# Patient Record
Sex: FEMALE | Race: WHITE | NOT HISPANIC OR LATINO
[De-identification: names, ages, dates, MRNs, and addresses within clinical notes are randomized per-mention and may not be internally consistent; named-entity substitution may affect disease eponyms.]

---

## 2017-02-08 ENCOUNTER — APPOINTMENT (OUTPATIENT)
Dept: SURGERY | Facility: CLINIC | Age: 62
End: 2017-02-08

## 2017-08-21 ENCOUNTER — APPOINTMENT (OUTPATIENT)
Dept: SURGERY | Facility: CLINIC | Age: 62
End: 2017-08-21
Payer: COMMERCIAL

## 2017-08-21 PROCEDURE — 99214 OFFICE O/P EST MOD 30 MIN: CPT

## 2018-08-20 ENCOUNTER — APPOINTMENT (OUTPATIENT)
Dept: SURGERY | Facility: CLINIC | Age: 63
End: 2018-08-20
Payer: COMMERCIAL

## 2018-08-20 PROCEDURE — 99214 OFFICE O/P EST MOD 30 MIN: CPT

## 2019-08-21 ENCOUNTER — APPOINTMENT (OUTPATIENT)
Dept: SURGERY | Facility: CLINIC | Age: 64
End: 2019-08-21
Payer: COMMERCIAL

## 2019-08-21 PROCEDURE — 99214 OFFICE O/P EST MOD 30 MIN: CPT

## 2019-08-21 NOTE — PHYSICAL EXAM
[de-identified] : No cervical or supraclavicular adenopathy, trachea midline, thyroid without enlargement or palpable nodules. [Normal] : orientation to person, place, and time: normal

## 2019-08-21 NOTE — HISTORY OF PRESENT ILLNESS
[de-identified] : Patient reports resolution of neck swelling and discomfort. Prior ultrasound January 14, 2016: Right local 0.4 x 1.8 x 1.5 CM with mid 1.6 x 1.2 x 1.3 CM nodule, 9 mm isthmus nodule unchanged. Left lobe 3.5 x 2 x 1.7 CM with ill defined 3 x 2 cm mass with increased vascularity.Biopsy was benign. Patient denies dysphagia, change in voice or recent illness.\par Followup ultrasound August 2016 with slight decrease in thyroid nodule size. Resolution of symptoms. Thyroid ultrasound August 2017, right nodule slightly decreased compared to prior study. Blood work in February normal. Patient with 3 1/2 year hx of thyorid nodules. prior biopsy of dominant right nodule 2016 benign.  Denies dysphagia, hoarseness palpitations or fatigue. now on [place] with dr de la cruz.  Denies recent illness. repeat US 8/2019 with slight increase in right nodule. still smaller than initial measurement 2016. \par

## 2020-02-26 ENCOUNTER — APPOINTMENT (OUTPATIENT)
Dept: SURGERY | Facility: CLINIC | Age: 65
End: 2020-02-26
Payer: COMMERCIAL

## 2020-02-26 PROCEDURE — 99213 OFFICE O/P EST LOW 20 MIN: CPT

## 2020-02-26 NOTE — HISTORY OF PRESENT ILLNESS
[de-identified] : Patient reports resolution of neck swelling and discomfort. Prior ultrasound January 14, 2016: Right local 0.4 x 1.8 x 1.5 CM with mid 1.6 x 1.2 x 1.3 CM nodule, 9 mm isthmus nodule unchanged. Left lobe 3.5 x 2 x 1.7 CM with ill defined 3 x 2 cm mass with increased vascularity.Biopsy was benign. Patient denies dysphagia, change in voice or recent illness.\par Followup ultrasound August 2016 with slight decrease in thyroid nodule size. Resolution of symptoms. Thyroid ultrasound August 2017, right nodule slightly decreased compared to prior study. Blood work in February normal. \par Patient with 4 year hx of thyorid nodules. prior biopsy of dominant right nodule 2016 benign.  Denies dysphagia, hoarseness palpitations or fatigue.  Thyroid US 2/2020 stable right nodule 1.2 cm. now on [place] with dr de la cruz.  Denies recent illness. \par

## 2020-02-26 NOTE — PHYSICAL EXAM
[de-identified] : No cervical or supraclavicular adenopathy, trachea midline, thyroid without enlargement or palpable nodules. [Normal] : no neck adenopathy [de-identified] : Skin:  normal appearance.  no rash, nodules, vesicles, or erythema,\par Musculoskeletal:  full range of motion and no deformities appreciated\par Neurological:  grossly intact\par Psychiatric:  oriented to person, place and time with appropriate affect

## 2020-02-26 NOTE — ASSESSMENT
[FreeTextEntry1] : no suspicious findings,stable nodule on US< will monitor, f/u US 8/2020 if stable RTo 1 year

## 2020-09-03 ENCOUNTER — APPOINTMENT (OUTPATIENT)
Dept: SURGERY | Facility: CLINIC | Age: 65
End: 2020-09-03
Payer: COMMERCIAL

## 2020-09-03 PROCEDURE — 99213 OFFICE O/P EST LOW 20 MIN: CPT

## 2020-09-03 NOTE — PHYSICAL EXAM
[de-identified] : No cervical or supraclavicular adenopathy, trachea midline, thyroid without enlargement or palpable nodules. [Normal] : no neck adenopathy [de-identified] : Skin:  normal appearance.  no rash, nodules, vesicles, or erythema,\par Musculoskeletal:  full range of motion and no deformities appreciated\par Neurological:  grossly intact\par Psychiatric:  oriented to person, place and time with appropriate affect

## 2020-09-03 NOTE — ASSESSMENT
[FreeTextEntry1] : no suspicious findings,stable nodule on US< will monitor, f/u US 3/2021   if stable RTo 1 year

## 2020-09-03 NOTE — HISTORY OF PRESENT ILLNESS
[de-identified] : Patient reports resolution of neck swelling and discomfort. Prior ultrasound January 14, 2016: Right local 0.4 x 1.8 x 1.5 CM with mid 1.6 x 1.2 x 1.3 CM nodule, 9 mm isthmus nodule unchanged. Left lobe 3.5 x 2 x 1.7 CM with ill defined 3 x 2 cm mass with increased vascularity.Biopsy was benign. Patient denies dysphagia, change in voice or recent illness.\par Followup ultrasound August 2016 with slight decrease in thyroid nodule size. Resolution of symptoms. Thyroid ultrasound August 2017, right nodule slightly decreased compared to prior study. Blood work in February normal. \par Patient with 4 year hx of thyroid nodules. prior biopsy of dominant right nodule 2016 benign.  Denies dysphagia, hoarseness palpitations or fatigue.  Thyroid US 8/2020 stable right nodule 1.2 cm. now on [place] with dr de la cruz.  Denies recent illness. \par

## 2021-03-09 ENCOUNTER — NON-APPOINTMENT (OUTPATIENT)
Age: 66
End: 2021-03-09

## 2021-04-15 ENCOUNTER — APPOINTMENT (OUTPATIENT)
Dept: SURGERY | Facility: CLINIC | Age: 66
End: 2021-04-15
Payer: COMMERCIAL

## 2021-04-15 ENCOUNTER — LABORATORY RESULT (OUTPATIENT)
Age: 66
End: 2021-04-15

## 2021-04-15 PROCEDURE — 99072 ADDL SUPL MATRL&STAF TM PHE: CPT

## 2021-04-15 PROCEDURE — 99213 OFFICE O/P EST LOW 20 MIN: CPT

## 2021-04-15 PROCEDURE — 10005 FNA BX W/US GDN 1ST LES: CPT

## 2021-04-15 NOTE — PHYSICAL EXAM
[de-identified] : No cervical or supraclavicular adenopathy, trachea midline, thyroid without enlargement or palpable nodules. [Normal] : no neck adenopathy [de-identified] : Skin:  normal appearance.  no rash, nodules, vesicles, or erythema,\par Musculoskeletal:  full range of motion and no deformities appreciated\par Neurological:  grossly intact\par Psychiatric:  oriented to person, place and time with appropriate affect

## 2021-04-15 NOTE — HISTORY OF PRESENT ILLNESS
[de-identified] : Patient reports resolution of neck swelling and discomfort. Prior ultrasound January 14, 2016: Right local 0.4 x 1.8 x 1.5 CM with mid 1.6 x 1.2 x 1.3 CM nodule, 9 mm isthmus nodule unchanged. Left lobe 3.5 x 2 x 1.7 CM with ill defined 3 x 2 cm mass with increased vascularity.Biopsy was benign. Patient denies dysphagia, change in voice or recent illness.\par Followup ultrasound August 2016 with slight decrease in thyroid nodule size. Resolution of symptoms. Thyroid ultrasound August 2017, right nodule slightly decreased compared to prior study. Blood work in February normal. \par Patient with 5 year hx of thyroid nodules. prior biopsy of dominant right nodule 2016 benign.  Denies dysphagia, hoarseness palpitations or fatigue.  Thyroid US 8/2020 stable right nodule 1.2 cm. f/u US 3/2021 with increase in right nodule now 2 cm.   now on [place] [address] with dr de la cruz had brianne 2 months ago in good range per patient, reports not available. Denies recent illness. I have reviewed all old and new data and available images.

## 2021-04-15 NOTE — ASSESSMENT
[FreeTextEntry1] : no suspicious findings,enlarging right nodule, US guided FNA [id number], if benign, f/u US 9/2021 , if stabl eRTO 1 year.

## 2021-04-15 NOTE — PROCEDURE
[FreeTextEntry1] : US  guided FNA [FreeTextEntry2] : enlarging right nodule now 2 cm [FreeTextEntry3] : Patient for thyroid biopsy. Risks benefits and alternatives discussed including bleeding, infection, swelling and need for repeat biopsy. Questions answered.\par Consent obtained, timeout taken.\par \par Patient supine.\par No anesthetic used. \par Nodule localized with US guidance\par Sterile technique with Betadine skin prep.\par 22-gauge needle under ultrasound guidance with a single pass.\par Slides prepared sent to histology.\par \par Post procedure:\par Hemostasis obtained, patient tolerated well, no complications.\par Post procedure instructions given.\par

## 2021-04-19 ENCOUNTER — NON-APPOINTMENT (OUTPATIENT)
Age: 66
End: 2021-04-19

## 2021-04-27 ENCOUNTER — APPOINTMENT (OUTPATIENT)
Dept: ULTRASOUND IMAGING | Facility: IMAGING CENTER | Age: 66
End: 2021-04-27
Payer: COMMERCIAL

## 2021-04-27 ENCOUNTER — RESULT REVIEW (OUTPATIENT)
Age: 66
End: 2021-04-27

## 2021-04-27 ENCOUNTER — OUTPATIENT (OUTPATIENT)
Dept: OUTPATIENT SERVICES | Facility: HOSPITAL | Age: 66
LOS: 1 days | End: 2021-04-27
Payer: COMMERCIAL

## 2021-04-27 DIAGNOSIS — E04.2 NONTOXIC MULTINODULAR GOITER: ICD-10-CM

## 2021-04-27 DIAGNOSIS — Z00.8 ENCOUNTER FOR OTHER GENERAL EXAMINATION: ICD-10-CM

## 2021-04-27 PROCEDURE — 10005 FNA BX W/US GDN 1ST LES: CPT

## 2021-04-27 PROCEDURE — 88173 CYTOPATH EVAL FNA REPORT: CPT | Mod: 26

## 2021-04-27 PROCEDURE — 88173 CYTOPATH EVAL FNA REPORT: CPT

## 2021-04-27 PROCEDURE — 88172 CYTP DX EVAL FNA 1ST EA SITE: CPT

## 2021-04-28 LAB — NON-GYNECOLOGICAL CYTOLOGY STUDY: SIGNIFICANT CHANGE UP

## 2021-04-30 ENCOUNTER — NON-APPOINTMENT (OUTPATIENT)
Age: 66
End: 2021-04-30

## 2021-05-10 ENCOUNTER — NON-APPOINTMENT (OUTPATIENT)
Age: 66
End: 2021-05-10

## 2021-10-09 ENCOUNTER — NON-APPOINTMENT (OUTPATIENT)
Age: 66
End: 2021-10-09

## 2022-04-28 ENCOUNTER — APPOINTMENT (OUTPATIENT)
Dept: SURGERY | Facility: CLINIC | Age: 67
End: 2022-04-28
Payer: COMMERCIAL

## 2022-04-28 PROCEDURE — 99213 OFFICE O/P EST LOW 20 MIN: CPT

## 2022-04-28 NOTE — HISTORY OF PRESENT ILLNESS
[de-identified] : Patient reports resolution of neck swelling and discomfort. Prior ultrasound January 14, 2016: Right local 0.4 x 1.8 x 1.5 CM with mid 1.6 x 1.2 x 1.3 CM nodule, 9 mm isthmus nodule unchanged. Left lobe 3.5 x 2 x 1.7 CM with ill defined 3 x 2 cm mass with increased vascularity.Biopsy was benign. Patient denies dysphagia, change in voice or recent illness.\par Followup ultrasound August 2016 with slight decrease in thyroid nodule size. Resolution of symptoms. Thyroid ultrasound August 2017, right nodule slightly decreased compared to prior study. Blood work in February normal. \par Patient with 6 year hx of thyroid nodules. prior biopsy of dominant right nodule 2016 benign.  Denies dysphagia, hoarseness palpitations or fatigue.  Thyroid US 8/2020 stable right nodule 1.2 cm. f/u US 3/2021 with increase in right nodule  2 cm. FNA 4/2021 benign.  now on Cyt[place] 125 with dr de la cruz monitoring brianne vikieling very well. thyroid uS 9/2021 with right nodule smaller now 1.5 cm. Denies recent illness. I have reviewed all old and new data and available images.

## 2022-04-28 NOTE — PHYSICAL EXAM
[de-identified] : No cervical or supraclavicular adenopathy, trachea midline, thyroid without enlargement or palpable nodules. [Normal] : no neck adenopathy [de-identified] : Skin:  normal appearance.  no rash, nodules, vesicles, or erythema,\par Musculoskeletal:  full range of motion and no deformities appreciated\par Neurological:  grossly intact\par Psychiatric:  oriented to person, place and time with appropriate affect

## 2022-04-28 NOTE — ASSESSMENT
[FreeTextEntry1] : no suspicious findings, stable right nodule, US guided FNA was benign, f/u US 9/2022 , if stable RTO 1 year.   I have answered their questions to the best of my ability.\par

## 2022-10-24 ENCOUNTER — NON-APPOINTMENT (OUTPATIENT)
Age: 67
End: 2022-10-24

## 2022-11-01 ENCOUNTER — APPOINTMENT (OUTPATIENT)
Dept: SURGERY | Facility: CLINIC | Age: 67
End: 2022-11-01

## 2022-11-01 PROCEDURE — 99213 OFFICE O/P EST LOW 20 MIN: CPT

## 2022-11-01 NOTE — PHYSICAL EXAM
[de-identified] : No cervical or supraclavicular adenopathy, trachea midline, thyroid without enlargement or palpable nodules. [Normal] : no neck adenopathy [de-identified] : Skin:  normal appearance.  no rash, nodules, vesicles, or erythema,\par Musculoskeletal:  full range of motion and no deformities appreciated\par Neurological:  grossly intact\par Psychiatric:  oriented to person, place and time with appropriate affect

## 2022-11-01 NOTE — HISTORY OF PRESENT ILLNESS
[de-identified] : Patient reports resolution of neck swelling and discomfort. Prior ultrasound January 14, 2016: Right local 0.4 x 1.8 x 1.5 CM with mid 1.6 x 1.2 x 1.3 CM nodule, 9 mm isthmus nodule unchanged. Left lobe 3.5 x 2 x 1.7 CM with ill defined 3 x 2 cm mass with increased vascularity.Biopsy was benign. Patient denies dysphagia, change in voice or recent illness.\par Followup ultrasound August 2016 with slight decrease in thyroid nodule size. Resolution of symptoms. Thyroid ultrasound August 2017, right nodule slightly decreased compared to prior study. Blood work in February normal. \par Patient with 7 year hx of thyroid nodules. prior biopsy of dominant right nodule 2016 benign.  Denies dysphagia, hoarseness palpitations or fatigue.  Thyroid US 8/2020 stable right nodule 1.2 cm. f/u US 3/2021 with increase in right nodule  2 cm. FNA 4/2021 benign.  now on Cytomel 125 with dr de la cruz monitoring brianne feeeling very well. thyroid uS 9/2021 with right nodule smaller now 1.5 cm.  Thyroid ultrasound September 2022 with new subcentimeter right upper nodule, continued decrease in previously noted right nodule..  Denies recent illness. I have reviewed all old and new data and available images.

## 2022-11-01 NOTE — ASSESSMENT
[FreeTextEntry1] : no suspicious findings, decreasing right nodule, prior  US guided FNA was benign, f/u US 9/2022 minimal change without suspicious characteristics.  Recommend repeat ultrasound September 2023, RTO 1 year.   I have answered their questions to the best of my ability.\par

## 2023-09-13 ENCOUNTER — NON-APPOINTMENT (OUTPATIENT)
Age: 68
End: 2023-09-13

## 2023-11-16 ENCOUNTER — APPOINTMENT (OUTPATIENT)
Dept: SURGERY | Facility: CLINIC | Age: 68
End: 2023-11-16
Payer: MEDICARE

## 2023-11-16 DIAGNOSIS — E04.2 NONTOXIC MULTINODULAR GOITER: ICD-10-CM

## 2023-11-16 PROCEDURE — 36415 COLL VENOUS BLD VENIPUNCTURE: CPT

## 2023-11-16 PROCEDURE — 99213 OFFICE O/P EST LOW 20 MIN: CPT

## 2023-11-21 ENCOUNTER — NON-APPOINTMENT (OUTPATIENT)
Age: 68
End: 2023-11-21

## 2023-11-21 DIAGNOSIS — E03.9 HYPOTHYROIDISM, UNSPECIFIED: ICD-10-CM

## 2023-11-21 LAB
T3 SERPL-MCNC: 639 NG/DL
T3FREE SERPL-MCNC: 21.2 PG/ML
T4 FREE SERPL-MCNC: 0.2 NG/DL
TSH SERPL-ACNC: <0.01 UIU/ML

## 2023-11-21 RX ORDER — LEVOTHYROXINE SODIUM 0.07 MG/1
75 TABLET ORAL DAILY
Qty: 30 | Refills: 5 | Status: ACTIVE | COMMUNITY
Start: 2023-11-21 | End: 1900-01-01

## 2023-12-07 ENCOUNTER — NON-APPOINTMENT (OUTPATIENT)
Age: 68
End: 2023-12-07

## 2024-10-23 ENCOUNTER — NON-APPOINTMENT (OUTPATIENT)
Age: 69
End: 2024-10-23

## 2024-10-24 ENCOUNTER — NON-APPOINTMENT (OUTPATIENT)
Age: 69
End: 2024-10-24

## 2024-12-03 ENCOUNTER — TRANSCRIPTION ENCOUNTER (OUTPATIENT)
Age: 69
End: 2024-12-03

## 2024-12-03 ENCOUNTER — APPOINTMENT (OUTPATIENT)
Dept: SURGERY | Facility: CLINIC | Age: 69
End: 2024-12-03
Payer: MEDICARE

## 2024-12-03 DIAGNOSIS — E04.2 NONTOXIC MULTINODULAR GOITER: ICD-10-CM

## 2024-12-03 PROCEDURE — 99213 OFFICE O/P EST LOW 20 MIN: CPT

## 2024-12-03 PROCEDURE — G2211 COMPLEX E/M VISIT ADD ON: CPT

## 2024-12-03 RX ORDER — LISINOPRIL 30 MG/1
TABLET ORAL
Refills: 0 | Status: ACTIVE | COMMUNITY

## 2024-12-03 RX ORDER — THYROID, PORCINE 60 MG/1
60 TABLET ORAL
Refills: 0 | Status: ACTIVE | COMMUNITY

## 2024-12-12 ENCOUNTER — OUTPATIENT (OUTPATIENT)
Dept: OUTPATIENT SERVICES | Facility: HOSPITAL | Age: 69
LOS: 1 days | End: 2024-12-12
Payer: MEDICARE

## 2024-12-12 ENCOUNTER — APPOINTMENT (OUTPATIENT)
Dept: MRI IMAGING | Facility: IMAGING CENTER | Age: 69
End: 2024-12-12
Payer: MEDICARE

## 2024-12-12 DIAGNOSIS — R92.2 INCONCLUSIVE MAMMOGRAM: ICD-10-CM

## 2024-12-12 PROCEDURE — A9585: CPT

## 2024-12-12 PROCEDURE — C8908: CPT | Mod: MH

## 2024-12-12 PROCEDURE — 77049 MRI BREAST C-+ W/CAD BI: CPT | Mod: 26,MH

## 2024-12-12 PROCEDURE — C8937: CPT

## 2024-12-17 ENCOUNTER — APPOINTMENT (OUTPATIENT)
Dept: ULTRASOUND IMAGING | Facility: CLINIC | Age: 69
End: 2024-12-17
Payer: MEDICARE

## 2024-12-17 ENCOUNTER — OUTPATIENT (OUTPATIENT)
Dept: OUTPATIENT SERVICES | Facility: HOSPITAL | Age: 69
LOS: 1 days | End: 2024-12-17
Payer: MEDICARE

## 2024-12-17 ENCOUNTER — APPOINTMENT (OUTPATIENT)
Dept: MAMMOGRAPHY | Facility: CLINIC | Age: 69
End: 2024-12-17
Payer: MEDICARE

## 2024-12-17 DIAGNOSIS — Z00.00 ENCOUNTER FOR GENERAL ADULT MEDICAL EXAMINATION WITHOUT ABNORMAL FINDINGS: ICD-10-CM

## 2024-12-17 PROCEDURE — 76642 ULTRASOUND BREAST LIMITED: CPT | Mod: 26,50

## 2024-12-17 PROCEDURE — 76642 ULTRASOUND BREAST LIMITED: CPT

## 2024-12-19 ENCOUNTER — OUTPATIENT (OUTPATIENT)
Dept: OUTPATIENT SERVICES | Facility: HOSPITAL | Age: 69
LOS: 1 days | End: 2024-12-19
Payer: MEDICARE

## 2024-12-19 ENCOUNTER — APPOINTMENT (OUTPATIENT)
Dept: ULTRASOUND IMAGING | Facility: CLINIC | Age: 69
End: 2024-12-19
Payer: MEDICARE

## 2024-12-19 DIAGNOSIS — Z00.8 ENCOUNTER FOR OTHER GENERAL EXAMINATION: ICD-10-CM

## 2024-12-19 DIAGNOSIS — R92.8 OTHER ABNORMAL AND INCONCLUSIVE FINDINGS ON DIAGNOSTIC IMAGING OF BREAST: ICD-10-CM

## 2024-12-19 PROCEDURE — 19083 BX BREAST 1ST LESION US IMAG: CPT

## 2024-12-19 PROCEDURE — 19083 BX BREAST 1ST LESION US IMAG: CPT | Mod: LT

## 2024-12-19 PROCEDURE — A4648: CPT

## 2024-12-19 PROCEDURE — 88305 TISSUE EXAM BY PATHOLOGIST: CPT | Mod: 26

## 2024-12-19 PROCEDURE — 88305 TISSUE EXAM BY PATHOLOGIST: CPT

## 2024-12-19 PROCEDURE — 77065 DX MAMMO INCL CAD UNI: CPT | Mod: 26,LT

## 2024-12-19 PROCEDURE — 77065 DX MAMMO INCL CAD UNI: CPT

## 2024-12-23 LAB — SURGICAL PATHOLOGY STUDY: SIGNIFICANT CHANGE UP
